# Patient Record
Sex: MALE | Race: WHITE | NOT HISPANIC OR LATINO | URBAN - METROPOLITAN AREA
[De-identification: names, ages, dates, MRNs, and addresses within clinical notes are randomized per-mention and may not be internally consistent; named-entity substitution may affect disease eponyms.]

---

## 2017-05-09 ENCOUNTER — EMERGENCY (EMERGENCY)
Facility: HOSPITAL | Age: 37
LOS: 1 days | Discharge: HOME | End: 2017-05-09
Attending: EMERGENCY MEDICINE | Admitting: EMERGENCY MEDICINE
Payer: SELF-PAY

## 2017-05-09 VITALS
HEART RATE: 77 BPM | DIASTOLIC BLOOD PRESSURE: 78 MMHG | OXYGEN SATURATION: 98 % | SYSTOLIC BLOOD PRESSURE: 123 MMHG | TEMPERATURE: 99 F | RESPIRATION RATE: 18 BRPM

## 2017-05-09 DIAGNOSIS — R36.9 URETHRAL DISCHARGE, UNSPECIFIED: ICD-10-CM

## 2017-05-09 PROCEDURE — 99284 EMERGENCY DEPT VISIT MOD MDM: CPT

## 2017-05-09 RX ORDER — CEFTRIAXONE 500 MG/1
250 INJECTION, POWDER, FOR SOLUTION INTRAMUSCULAR; INTRAVENOUS ONCE
Qty: 0 | Refills: 0 | Status: COMPLETED | OUTPATIENT
Start: 2017-05-09 | End: 2017-05-09

## 2017-05-09 NOTE — ED ADULT NURSE NOTE - OBJECTIVE STATEMENT
36 year old male A&OX3 presents with penile discharge x 2 days. Patient states that discharge star as white colored and is now yellow. Patient states he has had multiple sexual partners and denies condom use. Patient denies fevers, chills, pain, burning urination.

## 2017-05-10 LAB
HIV 1+2 AB+HIV1 P24 AG SERPL QL IA: SIGNIFICANT CHANGE UP
N GONORRHOEA RRNA SPEC QL NAA+PROBE: DETECTED
SPECIMEN SOURCE: SIGNIFICANT CHANGE UP
T PALLIDUM AB TITR SER: NEGATIVE — SIGNIFICANT CHANGE UP

## 2017-05-10 PROCEDURE — 99283 EMERGENCY DEPT VISIT LOW MDM: CPT | Mod: 25

## 2017-05-10 PROCEDURE — 87389 HIV-1 AG W/HIV-1&-2 AB AG IA: CPT

## 2017-05-10 PROCEDURE — 87591 N.GONORRHOEAE DNA AMP PROB: CPT

## 2017-05-10 PROCEDURE — 86780 TREPONEMA PALLIDUM: CPT

## 2017-05-10 PROCEDURE — 96372 THER/PROPH/DIAG INJ SC/IM: CPT

## 2017-05-10 RX ADMIN — CEFTRIAXONE 250 MILLIGRAM(S): 500 INJECTION, POWDER, FOR SOLUTION INTRAMUSCULAR; INTRAVENOUS at 00:05

## 2017-05-10 RX ADMIN — Medication 100 MILLIGRAM(S): at 00:05

## 2017-05-10 NOTE — ED PROVIDER NOTE - PLAN OF CARE
Abstain from sexual intercourse until antibiotic complete  Use condoms for protection against STD  Call [phone] for test results  If you have an STD you should contact all of your recent sexual partners since at least 1 week prior to the onset of your symptoms and explain to them that they should also be tested and treated.   Take Doxycycline 100mg twice daily for 2 weeks  Follow up with your Primary Care Physician within the next 2-3 days  Return to the Emergency Room if you experience new or worsening symptoms

## 2017-05-10 NOTE — ED PROVIDER NOTE - OBJECTIVE STATEMENT
estela - 36 m w no pmhx in monogomous relationship no prev std with penile dc and tingling no dysuria no freq no urgno hematuria no fever no rash -

## 2017-05-10 NOTE — ED PROVIDER NOTE - MEDICAL DECISION MAKING DETAILS
estela - painless penile dc - concern for gc/clymadia - tx w ceftriaxone, doxy - chk for syphillis and hiv - dc - w std precautions

## 2017-05-10 NOTE — ED PROVIDER NOTE - CARE PLAN
Principal Discharge DX:	Urethritis  Instructions for follow-up, activity and diet:	Abstain from sexual intercourse until antibiotic complete  Use condoms for protection against STD  Call [phone] for test results  If you have an STD you should contact all of your recent sexual partners since at least 1 week prior to the onset of your symptoms and explain to them that they should also be tested and treated.   Take Doxycycline 100mg twice daily for 2 weeks  Follow up with your Primary Care Physician within the next 2-3 days  Return to the Emergency Room if you experience new or worsening symptoms Principal Discharge DX:	Urethritis  Instructions for follow-up, activity and diet:	Abstain from sexual intercourse until antibiotic complete  Use condoms for protection against STD  Call [phone] for test results  If you have an STD you should contact all of your recent sexual partners since at least 1 week prior to the onset of your symptoms and explain to them that they should also be tested and treated.   Take Doxycycline 100mg twice daily for 2 weeks  Follow up with your Primary Care Physician within the next 2-3 days  Return to the Emergency Room if you experience new or worsening symptoms

## 2017-05-11 NOTE — ED POST DISCHARGE NOTE - DETAILS
5/11/17 12:56 called and left message 5/11/17 12:56 called and left message  13:00 pt called back feeling better taking doxycocline.  Informed he must inform any sexual partners he has recently had and to follow up with his PCP for further testing.  YOHAN

## 2019-07-09 NOTE — ED ADULT NURSE NOTE - CAS DISCH BELONGINGS RETURNED
Is This A New Presentation, Or A Follow-Up?: Skin Lesions
Have Your Skin Lesions Been Treated?: not been treated
Yes